# Patient Record
Sex: MALE | Race: WHITE
[De-identification: names, ages, dates, MRNs, and addresses within clinical notes are randomized per-mention and may not be internally consistent; named-entity substitution may affect disease eponyms.]

---

## 2023-09-13 ENCOUNTER — HOSPITAL ENCOUNTER (OUTPATIENT)
Dept: HOSPITAL 33 - SDC-PAIN | Age: 58
Discharge: HOME | End: 2023-09-13
Attending: PSYCHIATRY & NEUROLOGY
Payer: COMMERCIAL

## 2023-09-13 DIAGNOSIS — M54.16: Primary | ICD-10-CM

## 2023-09-13 PROCEDURE — 72100 X-RAY EXAM L-S SPINE 2/3 VWS: CPT

## 2023-09-13 PROCEDURE — 64484 NJX AA&/STRD TFRM EPI L/S EA: CPT

## 2023-09-13 PROCEDURE — 64483 NJX AA&/STRD TFRM EPI L/S 1: CPT

## 2023-09-13 PROCEDURE — 77003 FLUOROGUIDE FOR SPINE INJECT: CPT

## 2023-09-13 NOTE — XRAY
Indication: Left L4-S1 transforaminal ALYSE.



Intraoperative fluoroscopy provided for 27 seconds.  4 digital spot image

submitted for interpretation demonstrates posterior needle tips projecting

over the expected left L4 and L5 nerve roots.  Small amount of contrast

injected for needle tip placement.  Correlate with intraoperative

findings/report.